# Patient Record
Sex: MALE | Race: WHITE | Employment: FULL TIME | URBAN - METROPOLITAN AREA
[De-identification: names, ages, dates, MRNs, and addresses within clinical notes are randomized per-mention and may not be internally consistent; named-entity substitution may affect disease eponyms.]

---

## 2020-07-30 DIAGNOSIS — F90.9 ATTENTION DEFICIT HYPERACTIVITY DISORDER (ADHD), UNSPECIFIED ADHD TYPE: Primary | ICD-10-CM

## 2020-07-31 NOTE — TELEPHONE ENCOUNTER
Overdue for 4 month f/u. Please schedule for VV next Friday if possible. Then medication refill will be approved.

## 2020-08-04 VITALS
WEIGHT: 210 LBS | DIASTOLIC BLOOD PRESSURE: 82 MMHG | HEART RATE: 88 BPM | HEIGHT: 69 IN | SYSTOLIC BLOOD PRESSURE: 170 MMHG | TEMPERATURE: 98.2 F | OXYGEN SATURATION: 97 % | BODY MASS INDEX: 31.1 KG/M2

## 2020-08-04 PROBLEM — F41.8 PERFORMANCE ANXIETY: Status: ACTIVE | Noted: 2017-11-13

## 2020-08-04 PROBLEM — F90.0 ATTENTION DEFICIT HYPERACTIVITY DISORDER, PREDOMINANTLY INATTENTIVE TYPE: Status: ACTIVE | Noted: 2017-01-24

## 2020-08-04 PROBLEM — F41.1 GENERALIZED ANXIETY DISORDER: Status: ACTIVE | Noted: 2019-12-20

## 2020-08-04 PROBLEM — F40.248 FEAR OF PUBLIC SPEAKING: Status: ACTIVE | Noted: 2019-03-27

## 2020-08-04 RX ORDER — CITALOPRAM 20 MG/1
TABLET, FILM COATED ORAL
COMMUNITY
End: 2020-08-07

## 2020-08-04 RX ORDER — VENLAFAXINE HYDROCHLORIDE 75 MG/1
75 TABLET, EXTENDED RELEASE ORAL DAILY
COMMUNITY
End: 2020-08-07

## 2020-08-04 RX ORDER — PROPRANOLOL HYDROCHLORIDE 40 MG/1
40 TABLET ORAL DAILY
COMMUNITY
End: 2020-08-07

## 2020-08-07 ENCOUNTER — VIRTUAL VISIT (OUTPATIENT)
Dept: FAMILY MEDICINE CLINIC | Age: 40
End: 2020-08-07
Payer: COMMERCIAL

## 2020-08-07 DIAGNOSIS — F41.1 GENERALIZED ANXIETY DISORDER: ICD-10-CM

## 2020-08-07 DIAGNOSIS — F90.0 ATTENTION DEFICIT HYPERACTIVITY DISORDER, PREDOMINANTLY INATTENTIVE TYPE: Primary | ICD-10-CM

## 2020-08-07 DIAGNOSIS — F40.248 FEAR OF PUBLIC SPEAKING: ICD-10-CM

## 2020-08-07 PROCEDURE — 99214 OFFICE O/P EST MOD 30 MIN: CPT | Performed by: NURSE PRACTITIONER

## 2020-08-07 RX ORDER — PROPRANOLOL HYDROCHLORIDE 40 MG/1
40 TABLET ORAL DAILY
Qty: 30 TAB | Refills: 0 | Status: SHIPPED | OUTPATIENT
Start: 2020-08-07 | End: 2020-11-12 | Stop reason: SDUPTHER

## 2020-08-07 RX ORDER — CITALOPRAM 20 MG/1
20 TABLET, FILM COATED ORAL DAILY
Qty: 60 TAB | Refills: 0 | Status: SHIPPED | OUTPATIENT
Start: 2020-08-07 | End: 2020-08-07

## 2020-08-07 RX ORDER — ESCITALOPRAM OXALATE 10 MG/1
10 TABLET ORAL DAILY
Qty: 60 TAB | Refills: 0 | Status: SHIPPED | OUTPATIENT
Start: 2020-08-07 | End: 2021-02-12

## 2020-08-07 NOTE — PROGRESS NOTES
Consent: Belen Valerio, who was seen by synchronous (real-time) audio-video technology, and/or his healthcare decision maker, is aware that this patient-initiated, Telehealth encounter on 8/7/2020 is a billable service, with coverage as determined by his insurance carrier. He is aware that he may receive a bill and has provided verbal consent to proceed: YES-Consent obtained within past 12 months        712  Subjective:   Belen Valerio is a 42 y.o. male who was seen for Behavioral Problem  Patient presents for management of ADHD, anxiety, and fear of public speaking. He was last seen 2/28/2020. Stopped Venlafaxine due to excessive fatigue. Does not feel anxiety is well controlled off meds. Propranolol offered some improvement of fear of public speaking. Doing well on current dose of Vyvanse. Work Performance: no issue  Organization: good  Appetite: normal, no binge eating, weight up since COVID  Mood: an issue  Sleep: good, not tired at work  Friends: well connected with peers  Family: no new stressors  Self esteem: high    Prior to Admission medications    Medication Sig Start Date End Date Taking? Authorizing Provider   propranoloL (INDERAL) 40 mg tablet Take 1 Tab by mouth daily. 8/7/20  Yes Kel Dseouza NP   escitalopram oxalate (LEXAPRO) 10 mg tablet Take 1 Tab by mouth daily. 8/7/20  Yes Kel Desouza NP   citalopram (CELEXA) 20 mg tablet Take 1 Tab by mouth daily. 8/7/20 8/7/20  Kel Desouza NP   citalopram (CELEXA) 20 mg tablet citalopram 20 mg tablet   TAKE 1 TABLET BY MOUTH EVERY DAY  8/7/20  Provider, Historical   propranoloL (INDERAL) 40 mg tablet Take 40 mg by mouth daily. 8/7/20  Provider, Historical   Venlafaxine-ER 24 HR (EFFEXOR-ER) 75 mg tr24 tablet Take 75 mg by mouth daily.   8/7/20  Provider, Historical   lisdexamfetamine (Vyvanse) 30 mg capsule Vyvanse 30 mg capsule  TAKE 1 CAPSULE BY MOUTH TWICE A DAY 7/31/20   Kel Desouza NP     Allergies   Allergen Reactions  Sulfa (Sulfonamide Antibiotics) Nausea Only and Nausea and Vomiting     Patient Active Problem List    Diagnosis    Generalized anxiety disorder    Fear of public speaking    Performance anxiety    Attention deficit hyperactivity disorder, predominantly inattentive type         ROS  See HPI for pertinent ROS. Objective:   Vital Signs: (As obtained by patient/caregiver at home)  There were no vitals taken for this visit. [INSTRUCTIONS:  \"[x]\" Indicates a positive item  \"[]\" Indicates a negative item  -- DELETE ALL ITEMS NOT EXAMINED]    Constitutional: [x] Appears well-developed and well-nourished [x] No apparent distress      [] Abnormal -     Mental status: [x] Alert and awake  [x] Oriented to person/place/time [x] Able to follow commands    [] Abnormal -     Eyes:   EOM    [x]  Normal    [] Abnormal -   Sclera  [x]  Normal    [] Abnormal -          Discharge [x]  None visible   [] Abnormal -     HENT: [x] Normocephalic, atraumatic  [] Abnormal -   [x] Mouth/Throat: Mucous membranes are moist    External Ears [x] Normal  [] Abnormal -    Neck: [x] No visualized mass [] Abnormal -     Pulmonary/Chest: [x] Respiratory effort normal   [x] No visualized signs of difficulty breathing or respiratory distress        [] Abnormal -        Neurological:        [x] No Facial Asymmetry (Cranial nerve 7 motor function) (limited exam due to video visit)          [x] No gaze palsy        [] Abnormal -          Skin:        [x] No significant exanthematous lesions or discoloration noted on facial skin         [] Abnormal -            Psychiatric:       [x] Normal Affect [] Abnormal -        [x] No Hallucinations    Other pertinent observable physical exam findings:-              Assessment & Plan:   Diagnoses and all orders for this visit:    1. Attention deficit hyperactivity disorder, predominantly inattentive type   checked, no misuse or abuse noted.   ADHD remains well controlled on current medication and dose.    2. Generalized anxiety disorder  He has now failed Lexapro, Celexa, and venlafaxine. Discussed BuSpar versus Lexapro for treatment of anxiety and he preferrs to try Lexapro at this time. Reminded side effects are worse during the first week and then improve, take medication daily and not stop abruptly. It can take 6-8 weeks to reach therapeutic dosing.    -     escitalopram oxalate (LEXAPRO) 10 mg tablet; Take 1 Tab by mouth daily. 3. Fear of public speaking  Well controlled with Propranolol before speaking in front of large groups. -     propranoloL (INDERAL) 40 mg tablet; Take 1 Tab by mouth daily. Follow-up and Dispositions    · Return in about 4 months (around 12/7/2020) for follow up, adhd, anxiety (VVok). We discussed the expected course, resolution and complications of the diagnosis(es) in detail. Medication risks, benefits, costs, interactions, and alternatives were discussed as indicated. I advised him to contact the office if his condition worsens, changes or fails to improve as anticipated. He expressed understanding with the diagnosis(es) and plan. Dorota Fernandes is a 42 y.o. male being evaluated by a video visit encounter for concerns as above. A caregiver was present when appropriate. Due to this being a TeleHealth encounter (During KLZLU-19 public health emergency), evaluation of the following organ systems was limited: Vitals/Constitutional/EENT/Resp/CV/GI//MS/Neuro/Skin/Heme-Lymph-Imm. Pursuant to the emergency declaration under the [facility][note 3][facility], 1135 waiver authority and the [employer] Act, this Virtual  Visit was conducted, with patient's (and/or legal guardian's) consent, to reduce the patient's risk of exposure to COVID-19 and provide necessary medical care.      Services were provided through a video synchronous discussion virtually to substitute for in-person clinic visit. Patient and provider were located at their individual homes.         Abbe Perez NP

## 2020-08-31 DIAGNOSIS — F90.9 ATTENTION DEFICIT HYPERACTIVITY DISORDER (ADHD), UNSPECIFIED ADHD TYPE: ICD-10-CM

## 2020-10-09 ENCOUNTER — TELEPHONE (OUTPATIENT)
Dept: FAMILY MEDICINE CLINIC | Age: 40
End: 2020-10-09

## 2020-10-09 DIAGNOSIS — F90.9 ATTENTION DEFICIT HYPERACTIVITY DISORDER (ADHD), UNSPECIFIED ADHD TYPE: ICD-10-CM

## 2020-10-12 ENCOUNTER — TELEPHONE (OUTPATIENT)
Dept: FAMILY MEDICINE CLINIC | Age: 40
End: 2020-10-12

## 2020-11-12 ENCOUNTER — TELEPHONE (OUTPATIENT)
Dept: FAMILY MEDICINE CLINIC | Age: 40
End: 2020-11-12

## 2020-11-12 DIAGNOSIS — F90.9 ATTENTION DEFICIT HYPERACTIVITY DISORDER (ADHD), UNSPECIFIED ADHD TYPE: ICD-10-CM

## 2020-11-12 DIAGNOSIS — F40.248 FEAR OF PUBLIC SPEAKING: ICD-10-CM

## 2020-11-12 RX ORDER — PROPRANOLOL HYDROCHLORIDE 40 MG/1
40 TABLET ORAL DAILY
Qty: 30 TAB | Refills: 0 | Status: SHIPPED | OUTPATIENT
Start: 2020-11-12 | End: 2020-12-06

## 2020-12-05 DIAGNOSIS — F40.248 FEAR OF PUBLIC SPEAKING: ICD-10-CM

## 2020-12-06 RX ORDER — PROPRANOLOL HYDROCHLORIDE 40 MG/1
TABLET ORAL
Qty: 30 TAB | Refills: 0 | Status: SHIPPED | OUTPATIENT
Start: 2020-12-06

## 2020-12-17 DIAGNOSIS — F90.9 ATTENTION DEFICIT HYPERACTIVITY DISORDER (ADHD), UNSPECIFIED ADHD TYPE: ICD-10-CM

## 2021-02-03 DIAGNOSIS — F90.9 ATTENTION DEFICIT HYPERACTIVITY DISORDER (ADHD), UNSPECIFIED ADHD TYPE: ICD-10-CM

## 2021-02-09 ENCOUNTER — TELEPHONE (OUTPATIENT)
Dept: FAMILY MEDICINE CLINIC | Age: 41
End: 2021-02-09

## 2021-02-09 DIAGNOSIS — F90.9 ATTENTION DEFICIT HYPERACTIVITY DISORDER (ADHD), UNSPECIFIED ADHD TYPE: ICD-10-CM

## 2021-02-09 NOTE — TELEPHONE ENCOUNTER
Spoke to patient, patient has vv visit for Friday but was told needs a in office visit. Patient will schedule the in office visit but wants to know can you just refill his rx. I told him you have sent 10 to pharmacy. He states he has to pay 40 dollars to get the 10 then pay another 40 to get the others when he comes in for his appt and doesn't want to have to pay that kind of money. Please advise

## 2021-02-09 NOTE — TELEPHONE ENCOUNTER
Please call wife and find out what she needs. This is the first I am seeing this. Since he is two months overdue for f/u, I am sending 10 tablets to his pharmacy. This will be the last one until he is seen.

## 2021-02-10 NOTE — TELEPHONE ENCOUNTER
He can be seen virtually on Friday but the next one will have to be in person. I am not sending any more tablets until he is seen. He is 2 months overdue and was told last month there would be no refills until he has an appointment.

## 2021-02-12 ENCOUNTER — VIRTUAL VISIT (OUTPATIENT)
Dept: FAMILY MEDICINE CLINIC | Age: 41
End: 2021-02-12
Payer: COMMERCIAL

## 2021-02-12 DIAGNOSIS — F90.9 ATTENTION DEFICIT HYPERACTIVITY DISORDER (ADHD), UNSPECIFIED ADHD TYPE: ICD-10-CM

## 2021-02-12 DIAGNOSIS — F41.1 GENERALIZED ANXIETY DISORDER: Primary | ICD-10-CM

## 2021-02-12 DIAGNOSIS — F40.248 FEAR OF PUBLIC SPEAKING: ICD-10-CM

## 2021-02-12 PROCEDURE — 99214 OFFICE O/P EST MOD 30 MIN: CPT | Performed by: NURSE PRACTITIONER

## 2021-02-12 RX ORDER — CITALOPRAM 20 MG/1
20 TABLET, FILM COATED ORAL DAILY
COMMUNITY
End: 2021-02-12 | Stop reason: SDUPTHER

## 2021-02-12 RX ORDER — CITALOPRAM 20 MG/1
20 TABLET, FILM COATED ORAL DAILY
Qty: 90 TAB | Refills: 3 | Status: SHIPPED | OUTPATIENT
Start: 2021-02-12 | End: 2021-12-21 | Stop reason: SDUPTHER

## 2021-02-12 NOTE — PROGRESS NOTES
Consent: Loring Gowers, who was seen by synchronous (real-time) audio-video technology, and/or his healthcare decision maker, is aware that this patient-initiated, Telehealth encounter on 2/12/2021 is a billable service, with coverage as determined by his insurance carrier. He is aware that he may receive a bill and has provided verbal consent to proceed: YES-Consent obtained within past 12 months        712  Subjective:   Loring Gowers is a 47 y.o. male who was seen for Follow Up Chronic Condition  Patient presents for management of ADHD, anxiety, and fear of public speaking. Medications reviewed, taking as prescribed with no known side effects. Anxiety remains well controlled on Celexa daily. Declines trial off medication. Fear of public speaking is well controlled with propanolol prior to speaking. Has needed 2 tablets since last refill in December. ADHD remains well controlled on Vyvanse 30 mg twice daily. Work Performance: no issues  Organization: good  Appetite: normal, no binge eating, weight stable  Mood: stable  Sleep: good, not tired at work  Friends: well connected with peers  Family: no new stressors  Self esteem: high      Prior to Admission medications    Medication Sig Start Date End Date Taking? Authorizing Provider   lisdexamfetamine (Vyvanse) 30 mg capsule Vyvanse 30 mg capsule  TAKE 1 CAPSULE BY MOUTH TWICE A DAY 2/12/21  Yes Saadia Baeza NP   citalopram (CELEXA) 20 mg tablet Take 1 Tab by mouth daily. 2/12/21  Yes Saadia Baeza NP   propranoloL (INDERAL) 40 mg tablet TAKE 1 TABLET BY MOUTH EVERY DAY 12/6/20  Yes Saadia Baeza NP   citalopram (CELEXA) 20 mg tablet Take 20 mg by mouth daily.   2/12/21  Provider, Historical   lisdexamfetamine (Vyvanse) 30 mg capsule Vyvanse 30 mg capsule  TAKE 1 CAPSULE BY MOUTH TWICE A DAY (OVERDUE FOR OFFICE VISIT, LAST REFILL UNTIL SEEN) 2/9/21 2/12/21  Saadia Baeza NP   escitalopram oxalate (LEXAPRO) 10 mg tablet Take 1 Tab by mouth daily. 8/7/20 2/12/21  Miguel Hare NP     Allergies   Allergen Reactions    Sulfa (Sulfonamide Antibiotics) Nausea Only and Nausea and Vomiting     Patient Active Problem List    Diagnosis    Generalized anxiety disorder    Fear of public speaking    Performance anxiety    Attention deficit hyperactivity disorder, predominantly inattentive type         ROS  See HPI for pertinent ROS. Objective:   Vital Signs: (As obtained by patient/caregiver at home)  There were no vitals taken for this visit. [INSTRUCTIONS:  \"[x]\" Indicates a positive item  \"[]\" Indicates a negative item  -- DELETE ALL ITEMS NOT EXAMINED]    Constitutional: [x] Appears well-developed and well-nourished [x] No apparent distress      [] Abnormal -     Mental status: [x] Alert and awake  [x] Oriented to person/place/time [x] Able to follow commands    [] Abnormal -     Eyes:   EOM    [x]  Normal    [] Abnormal -   Sclera  [x]  Normal    [] Abnormal -          Discharge [x]  None visible   [] Abnormal -     HENT: [x] Normocephalic, atraumatic  [] Abnormal -   [x] Mouth/Throat: Mucous membranes are moist    External Ears [x] Normal  [] Abnormal -    Neck: [x] No visualized mass [] Abnormal -     Pulmonary/Chest: [x] Respiratory effort normal   [x] No visualized signs of difficulty breathing or respiratory distress        [] Abnormal -        Neurological:        [x] No Facial Asymmetry (Cranial nerve 7 motor function) (limited exam due to video visit)          [x] No gaze palsy        [] Abnormal -          Skin:        [x] No significant exanthematous lesions or discoloration noted on facial skin         [] Abnormal -            Psychiatric:       [x] Normal Affect [] Abnormal -        [x] No Hallucinations    Other pertinent observable physical exam findings:-              Assessment & Plan:   Diagnoses and all orders for this visit:    1. Generalized anxiety disorder  Well-controlled on Celexa daily.   Reminded to take medication daily and do not abruptly discontinue. -     citalopram (CELEXA) 20 mg tablet; Take 1 Tab by mouth daily. 2. Fear of public speaking  Well controlled with propanolol as needed. Medication will be refilled when requested. 3. Attention deficit hyperactivity disorder (ADHD), unspecified ADHD type   checked, no misuse or abuse noted. ADHD remains well controlled on current medication and dose. Medication refilled today. Pharmacy notified to cancel #10 tablets ordered on 2/9/2021. Reminded office visits are required every 4 months, next visit due in June. -     lisdexamfetamine (Vyvanse) 30 mg capsule; Vyvanse 30 mg capsule  TAKE 1 CAPSULE BY MOUTH TWICE A DAY          Follow-up and Dispositions    · Return in about 4 months (around 6/12/2021) for wellness, fasting labs, follow up, adhd. We discussed the expected course, resolution and complications of the diagnosis(es) in detail. Medication risks, benefits, costs, interactions, and alternatives were discussed as indicated. I advised him to contact the office if his condition worsens, changes or fails to improve as anticipated. He expressed understanding with the diagnosis(es) and plan. Naomi Roberto is a 47 y.o. male being evaluated by a video visit encounter for concerns as above. A caregiver was present when appropriate. Due to this being a TeleHealth encounter (During GFSXI-14 public health emergency), evaluation of the following organ systems was limited: Vitals/Constitutional/EENT/Resp/CV/GI//MS/Neuro/Skin/Heme-Lymph-Imm. Pursuant to the emergency declaration under the [facility], 1135 waiver authority and the [employer] Act, this Virtual  Visit was conducted, with patient's (and/or legal guardian's) consent, to reduce the patient's risk of exposure to COVID-19 and provide necessary medical care.      Services were provided through a video synchronous discussion virtually to substitute for in-person clinic visit. Patient and provider were located at their individual homes.         Maddy Trujillo NP

## 2021-03-17 DIAGNOSIS — F90.9 ATTENTION DEFICIT HYPERACTIVITY DISORDER (ADHD), UNSPECIFIED ADHD TYPE: ICD-10-CM

## 2021-04-19 DIAGNOSIS — F90.9 ATTENTION DEFICIT HYPERACTIVITY DISORDER (ADHD), UNSPECIFIED ADHD TYPE: ICD-10-CM

## 2021-05-17 ENCOUNTER — TELEPHONE (OUTPATIENT)
Dept: FAMILY MEDICINE CLINIC | Age: 41
End: 2021-05-17

## 2021-05-17 DIAGNOSIS — F90.9 ATTENTION DEFICIT HYPERACTIVITY DISORDER (ADHD), UNSPECIFIED ADHD TYPE: ICD-10-CM

## 2021-05-17 NOTE — TELEPHONE ENCOUNTER
Patient needs a refill for his Vyvanse and he is leaving for [place] today and will be there they rest of the week. He did not know this.

## 2021-06-15 ENCOUNTER — TELEPHONE (OUTPATIENT)
Dept: FAMILY MEDICINE CLINIC | Age: 41
End: 2021-06-15

## 2021-06-15 DIAGNOSIS — F90.9 ATTENTION DEFICIT HYPERACTIVITY DISORDER (ADHD), UNSPECIFIED ADHD TYPE: ICD-10-CM

## 2021-06-15 NOTE — TELEPHONE ENCOUNTER
I have approved medication for  on 6/18. This will be his last refill until he is seen in the office.

## 2021-06-15 NOTE — TELEPHONE ENCOUNTER
PT stated he will be out of town working for the next 2 weeks and would like a refill on Vyvanse 30 mg capsule. I explained to pt that he is required to have an in office visit in order to get any refills. Pt had an appt 05/27 and was a no show.

## 2021-07-23 ENCOUNTER — OFFICE VISIT (OUTPATIENT)
Dept: FAMILY MEDICINE CLINIC | Age: 41
End: 2021-07-23
Payer: COMMERCIAL

## 2021-07-23 VITALS
DIASTOLIC BLOOD PRESSURE: 80 MMHG | BODY MASS INDEX: 30.24 KG/M2 | OXYGEN SATURATION: 96 % | SYSTOLIC BLOOD PRESSURE: 124 MMHG | WEIGHT: 204.2 LBS | HEART RATE: 71 BPM | HEIGHT: 69 IN | TEMPERATURE: 97 F | RESPIRATION RATE: 18 BRPM

## 2021-07-23 DIAGNOSIS — E66.09 CLASS 1 OBESITY DUE TO EXCESS CALORIES WITH BODY MASS INDEX (BMI) OF 30.0 TO 30.9 IN ADULT, UNSPECIFIED WHETHER SERIOUS COMORBIDITY PRESENT: ICD-10-CM

## 2021-07-23 DIAGNOSIS — F90.9 ATTENTION DEFICIT HYPERACTIVITY DISORDER (ADHD), UNSPECIFIED ADHD TYPE: Primary | ICD-10-CM

## 2021-07-23 DIAGNOSIS — F41.1 GENERALIZED ANXIETY DISORDER: ICD-10-CM

## 2021-07-23 DIAGNOSIS — F40.248 FEAR OF PUBLIC SPEAKING: ICD-10-CM

## 2021-07-23 PROCEDURE — 99214 OFFICE O/P EST MOD 30 MIN: CPT | Performed by: NURSE PRACTITIONER

## 2021-07-23 NOTE — PROGRESS NOTES
Chief Complaint   Patient presents with    Medication Refill     vynase    Follow-up     1. Have you been to the ER, urgent care clinic since your last visit? Hospitalized since your last visit? No    2. Have you seen or consulted any other health care providers outside of the [address] [place] [address] since your last visit? Include any pap smears or colon screening.  No  3 most recent PHQ Screens 7/23/2021   Little interest or pleasure in doing things Not at all   Feeling down, depressed, irritable, or hopeless Not at all   Total Score PHQ 2 0     Visit Vitals  /80 (BP 1 Location: Left upper arm, BP Patient Position: Sitting, BP Cuff Size: Adult)   Pulse 71   Temp 97 °F (36.1 °C) (Temporal)   Resp 18   Ht 5' 9\" (1.753 m)   Wt 204 lb 3.2 oz (92.6 kg)   SpO2 96%   BMI 30.16 kg/m²

## 2021-07-23 NOTE — PROGRESS NOTES
Subjective  Chief Complaint   Patient presents with    Medication Refill     vynase    Follow-up     HPI:  Sharyle Parks is a 47 y.o. male. Patient presents for management of ADHD. Last seen in the office 2020. Feels ADHD remains well controlled on Vyvanse 30mg BID. Work Performance: no issues  Organization: good  Appetite: normal, no binge eating, weight stable  Mood: stable  Sleep: good, not tired at work  Friends: well connected with peers  Family: no new stressors  Self esteem: high    Past Medical History:   Diagnosis Date    ADHD (attention deficit hyperactivity disorder)     Fear of public speaking     Generalized anxiety disorder     Performance anxiety      Family History   Problem Relation Age of Onset    Other Father         Multiple Myeloma    Heart Disease Maternal Grandmother     Kidney Disease Maternal Grandmother     Heart Disease Maternal Grandfather     No Known Problems Mother      Social History     Socioeconomic History    Marital status: SINGLE     Spouse name: Not on file    Number of children: Not on file    Years of education: Not on file    Highest education level: Not on file   Occupational History    Not on file   Tobacco Use    Smoking status: Former Smoker     Packs/day: 1.00     Years: 20.00     Pack years: 20.00     Quit date:      Years since quittin.5    Smokeless tobacco: Never Used   Vaping Use    Vaping Use: Never used   Substance and Sexual Activity    Alcohol use: Yes     Comment: Moderate    Drug use: Never    Sexual activity: Yes   Other Topics Concern    Not on file   Social History Narrative    Not on file     Social Determinants of Health     Financial Resource Strain:     Difficulty of Paying Living Expenses:    Food Insecurity:     Worried About Running Out of Food in the Last Year:     [address] in the Last Year:    Transportation Needs:     Lack of Transportation (Medical):      Lack of Transportation (Non-Medical):    Physical Activity:     Days of Exercise per Week:     Minutes of Exercise per Session:    Stress:     Feeling of Stress :    Social Connections:     Frequency of Communication with Friends and Family:     Frequency of Social Gatherings with Friends and Family:     Attends [employer] Services:     Active Member of Clubs or Organizations:     Attends Club or Organization Meetings:     Marital Status:    Intimate Partner Violence:     Fear of Current or Ex-Partner:     Emotionally Abused:     Physically Abused:     Sexually Abused:      Current Outpatient Medications on File Prior to Visit   Medication Sig Dispense Refill    citalopram (CELEXA) 20 mg tablet Take 1 Tab by mouth daily. 90 Tab 3    propranoloL (INDERAL) 40 mg tablet TAKE 1 TABLET BY MOUTH EVERY DAY 30 Tab 0    [DISCONTINUED] lisdexamfetamine (Vyvanse) 30 mg capsule Vyvanse 30 mg capsule  TAKE 1 CAPSULE BY MOUTH TWICE A DAY 60 Capsule 0     No current facility-administered medications on file prior to visit. Allergies   Allergen Reactions    Sulfa (Sulfonamide Antibiotics) Nausea Only and Nausea and Vomiting     ROS  See HPI for pertinent ROS. Objective  Visit Vitals  /80 (BP 1 Location: Left upper arm, BP Patient Position: Sitting, BP Cuff Size: Adult)   Pulse 71   Temp 97 °F (36.1 °C) (Temporal)   Resp 18   Ht 5' 9\" (1.753 m)   Wt 204 lb 3.2 oz (92.6 kg)   SpO2 96%   BMI 30.16 kg/m²       Physical Exam  Vitals and nursing note reviewed. Constitutional:       General: He is not in acute distress. Appearance: Normal appearance. He is obese. HENT:      Head: Normocephalic. Eyes:      Extraocular Movements: Extraocular movements intact. Cardiovascular:      Rate and Rhythm: Normal rate and regular rhythm. Heart sounds: Normal heart sounds. Pulmonary:      Effort: Pulmonary effort is normal.      Breath sounds: Normal breath sounds. Musculoskeletal:         General: Normal range of motion. Right lower leg: No edema. Left lower leg: No edema. Skin:     General: Skin is warm and dry. Neurological:      Mental Status: He is alert and oriented to person, place, and time. Psychiatric:         Mood and Affect: Mood normal.         Behavior: Behavior normal.          Assessment & Plan      ICD-10-CM ICD-9-CM    1. Attention deficit hyperactivity disorder (ADHD), unspecified ADHD type  F90.9 314.01 lisdexamfetamine (Vyvanse) 30 mg capsule   2. Generalized anxiety disorder  F41.1 300.02    3. Fear of public speaking  R52.112 300.23    4. Class 1 obesity due to excess calories with body mass index (BMI) of 30.0 to 30.9 in adult, unspecified whether serious comorbidity present  E66.09 278.00     Z68.30 V85.30      Diagnoses and all orders for this visit:    1. Attention deficit hyperactivity disorder (ADHD), unspecified ADHD type  PDMP checked, no misuse or abuse noted. ADHD remains well controlled on current medication and dose. Medication refilled as requested. -     lisdexamfetamine (Vyvanse) 30 mg capsule; Vyvanse 30 mg capsule  TAKE 1 CAPSULE BY MOUTH TWICE A DAY    2. Generalized anxiety disorder  Anxiety remains well controlled on Celexa daily. Continue to take medication daily and do not abruptly discontinue. 3. Fear of public speaking  Remains well controlled with propanolol as needed. Typically needs 1 to 2 tablets/month. 4. Class 1 obesity due to excess calories with body mass index (BMI) of 30.0 to 30.9 in adult, unspecified whether serious comorbidity present  Weight is down 6 pounds since his last office visit. Follow-up and Dispositions    · Return in about 4 months (around 11/23/2021) for wellness, fasting labs, follow up, adhd, anxiety.            Marc Dumont NP

## 2021-08-24 DIAGNOSIS — F90.9 ATTENTION DEFICIT HYPERACTIVITY DISORDER (ADHD), UNSPECIFIED ADHD TYPE: ICD-10-CM

## 2021-09-27 DIAGNOSIS — F90.9 ATTENTION DEFICIT HYPERACTIVITY DISORDER (ADHD), UNSPECIFIED ADHD TYPE: ICD-10-CM

## 2021-11-02 DIAGNOSIS — F90.9 ATTENTION DEFICIT HYPERACTIVITY DISORDER (ADHD), UNSPECIFIED ADHD TYPE: ICD-10-CM

## 2021-12-17 DIAGNOSIS — F41.1 GENERALIZED ANXIETY DISORDER: ICD-10-CM

## 2021-12-17 DIAGNOSIS — F90.9 ATTENTION DEFICIT HYPERACTIVITY DISORDER (ADHD), UNSPECIFIED ADHD TYPE: ICD-10-CM

## 2021-12-17 RX ORDER — CITALOPRAM 20 MG/1
20 TABLET, FILM COATED ORAL DAILY
Qty: 90 TABLET | Refills: 3 | Status: CANCELLED | OUTPATIENT
Start: 2021-12-17

## 2021-12-20 ENCOUNTER — TELEPHONE (OUTPATIENT)
Dept: FAMILY MEDICINE CLINIC | Age: 41
End: 2021-12-20

## 2021-12-20 DIAGNOSIS — F90.9 ATTENTION DEFICIT HYPERACTIVITY DISORDER (ADHD), UNSPECIFIED ADHD TYPE: ICD-10-CM

## 2021-12-20 DIAGNOSIS — F41.1 GENERALIZED ANXIETY DISORDER: ICD-10-CM

## 2021-12-21 RX ORDER — CITALOPRAM 20 MG/1
20 TABLET, FILM COATED ORAL DAILY
Qty: 30 TABLET | Refills: 0 | Status: SHIPPED | OUTPATIENT
Start: 2021-12-21 | End: 2022-01-26 | Stop reason: SDUPTHER

## 2021-12-21 NOTE — TELEPHONE ENCOUNTER
Overdue for 4-month follow-up and no showed 11/24/2021 appointment. Please reschedule and I will send a one-time 30-day supply to his pharmacy.

## 2021-12-21 NOTE — TELEPHONE ENCOUNTER
Overdue for 4 mo follow up of ADHD. He no showed his last appointment. Please have him schedule office visit and I will refill meds for 30 days.

## 2021-12-22 ENCOUNTER — TELEPHONE (OUTPATIENT)
Dept: FAMILY MEDICINE CLINIC | Age: 41
End: 2021-12-22

## 2021-12-22 DIAGNOSIS — F90.9 ATTENTION DEFICIT HYPERACTIVITY DISORDER (ADHD), UNSPECIFIED ADHD TYPE: ICD-10-CM

## 2021-12-22 NOTE — TELEPHONE ENCOUNTER
SISSY, I verified with pharmacy and they are out. I went ahead and canceled that order. Can you please send to the [facility] requested. I have changed the pharmacy.

## 2021-12-22 NOTE — TELEPHONE ENCOUNTER
Patient stated that he needs the Vyvanse sent to the [facility] on [address] because the previous pharmacy that it was sent to was out of stock and he is now completely out.

## 2022-01-25 DIAGNOSIS — F90.9 ATTENTION DEFICIT HYPERACTIVITY DISORDER (ADHD), UNSPECIFIED ADHD TYPE: ICD-10-CM

## 2022-01-25 NOTE — TELEPHONE ENCOUNTER
No showed his last 2 appointments and now 2 months overdue for office visit. Office visit required for more refills.

## 2022-01-26 ENCOUNTER — OFFICE VISIT (OUTPATIENT)
Dept: FAMILY MEDICINE CLINIC | Age: 42
End: 2022-01-26
Payer: COMMERCIAL

## 2022-01-26 VITALS
TEMPERATURE: 97.3 F | HEIGHT: 70 IN | HEART RATE: 76 BPM | DIASTOLIC BLOOD PRESSURE: 75 MMHG | BODY MASS INDEX: 29.2 KG/M2 | SYSTOLIC BLOOD PRESSURE: 132 MMHG | WEIGHT: 204 LBS | OXYGEN SATURATION: 97 % | RESPIRATION RATE: 16 BRPM

## 2022-01-26 DIAGNOSIS — F90.0 ATTENTION DEFICIT HYPERACTIVITY DISORDER (ADHD), PREDOMINANTLY INATTENTIVE TYPE: ICD-10-CM

## 2022-01-26 DIAGNOSIS — F41.1 GENERALIZED ANXIETY DISORDER: Primary | ICD-10-CM

## 2022-01-26 DIAGNOSIS — F40.248 FEAR OF PUBLIC SPEAKING: ICD-10-CM

## 2022-01-26 PROCEDURE — 99214 OFFICE O/P EST MOD 30 MIN: CPT | Performed by: NURSE PRACTITIONER

## 2022-01-26 RX ORDER — CITALOPRAM 20 MG/1
20 TABLET, FILM COATED ORAL DAILY
Qty: 90 TABLET | Refills: 2 | Status: SHIPPED | OUTPATIENT
Start: 2022-01-26 | End: 2022-06-21 | Stop reason: SDUPTHER

## 2022-01-26 NOTE — PROGRESS NOTES
Chief Complaint   Patient presents with    Follow-up     refill for Vyvanse     Visit Vitals  /75 (BP 1 Location: Left upper arm, BP Patient Position: Sitting)   Pulse 76   Temp 97.3 °F (36.3 °C) (Axillary)   Resp 16   Ht 5' 10\" (1.778 m)   Wt 204 lb (92.5 kg)   SpO2 97%   BMI 29.27 kg/m²     1. Have you been to the ER, urgent care clinic since your last visit? Hospitalized since your last visit? No    2. Have you seen or consulted any other health care providers outside of the [address] [place] [address] since your last visit? Include any pap smears or colon screening.  No

## 2022-01-26 NOTE — PROGRESS NOTES
Laurence Lee (: 1980) is a 46 y.o. male  is here for evaluation of the following chief complaint(s): Follow-up (refill for Vyvanse)     . Historian:  excellent    Assessment/Plan:     Diagnoses and all orders for this visit:    1. Generalized anxiety disorder  -     citalopram (CELEXA) 20 mg tablet; Take 1 Tablet by mouth daily. Indications: repeated episodes of anxiety    2. Attention deficit hyperactivity disorder (ADHD), predominantly inattentive type  -     lisdexamfetamine (Vyvanse) 30 mg capsule; Vyvanse 30 mg capsule  TAKE 1 CAPSULE BY MOUTH TWICE A DAY    3. Fear of public speaking    Since last visit: no change. Medication compliance: all of the time. Reports symptoms are well controlled. Side effects from medication include: none.  has been reviewed. Return in about 4 months (around 2022) for wellness, adhd. Subjective/Objective:   His concerns today are chronic conditions. Symptoms have been present for several years. He reports these issues have had the following impacting on their life: trouble at work. Family history of ADHD: Yes, No.    Marital Status: co-habitating  Arrived With: alone  Handedness: right handed  Work Status: full time job doing superintendent  Assistive Devices:  sunglasses, no corrective lenses  Symptoms of inattention: has difficulty organizing tasks and activities, loses things that are necessary for tasks and activities, is easily distracted by extraneous stimuli, is often forgetful in daily activities. Symptoms of hyperactivity include: none. Symptoms of impulsivity: none. Appetite: good  Sleep: excellent  Counseling for same: No  Workup for problem, if any: none    Previous ADHD testing has been completed: yes. Current medications include: Vyvanse 30. He has tried the following medications in the past: no side effects   has been reviewed. reports that he quit smoking about 12 months ago. He has a 20.00 pack-year smoking history.  He has never used smokeless tobacco. He reports current alcohol use. He reports that he does not use drugs.         Current Outpatient Medications   Medication Instructions    citalopram (CELEXA) 20 mg, Oral, DAILY    lisdexamfetamine (Vyvanse) 30 mg capsule Vyvanse 30 mg capsule  TAKE 1 CAPSULE BY MOUTH TWICE A DAY    propranoloL (INDERAL) 40 mg tablet TAKE 1 TABLET BY MOUTH EVERY DAY     Past Medical History:   Diagnosis Date    ADHD (attention deficit hyperactivity disorder)     Fear of public speaking     Generalized anxiety disorder     Performance anxiety        ROS:  Denies  - Involuntary weight loss  - New or worsening abdominal discomfort, nausea, vomiting or changes in bowel pattern  - New or worsening depression or thoughts of self harm  - Feelings of irritability, agitation, aggression or increased anxiety  - New or worsening difficulty sleeping  - Hallucinations or confusion  - Chest pain or shortness of breath  - Irregular or pounding heart beat  - Light-headedness or dizziness, expecially upon standing from a sitting or lying position  - Episodes of fainting or near fainting  - New or worsening headaches  - New or worsening tremors or tics  - New or unexplained changes in vision  - Any signs of allergic reactions to medication, such as a new rash or itchiness    Vitals:    01/26/22 1459   BP: 132/75   Pulse: 76   Resp: 16   Temp: 97.3 °F (36.3 °C)   TempSrc: Axillary   SpO2: 97%   Weight: 204 lb (92.5 kg)   Height: 5' 10\" (1.778 m)       Physical Exam:  Alertness: alert,   Orientation: time, date, place  Behavior: normal  Speech: appropriate quality, quantity and organization of sentences  Thought content: normal  Affect: euthymic  Affect: normal  Thought content exhibits logical connections  Motor: within normal limits

## 2022-01-26 NOTE — PATIENT INSTRUCTIONS

## 2022-02-25 DIAGNOSIS — F90.0 ATTENTION DEFICIT HYPERACTIVITY DISORDER (ADHD), PREDOMINANTLY INATTENTIVE TYPE: ICD-10-CM

## 2022-03-18 PROBLEM — F40.248 FEAR OF PUBLIC SPEAKING: Status: ACTIVE | Noted: 2019-03-27

## 2022-03-18 PROBLEM — F41.1 GENERALIZED ANXIETY DISORDER: Status: ACTIVE | Noted: 2019-12-20

## 2022-03-19 PROBLEM — F90.0 ATTENTION DEFICIT HYPERACTIVITY DISORDER, PREDOMINANTLY INATTENTIVE TYPE: Status: ACTIVE | Noted: 2017-01-24

## 2022-03-20 PROBLEM — F41.8 PERFORMANCE ANXIETY: Status: ACTIVE | Noted: 2017-11-13

## 2022-03-25 ENCOUNTER — TELEPHONE (OUTPATIENT)
Dept: FAMILY MEDICINE CLINIC | Age: 42
End: 2022-03-25

## 2022-03-25 DIAGNOSIS — F90.0 ATTENTION DEFICIT HYPERACTIVITY DISORDER (ADHD), PREDOMINANTLY INATTENTIVE TYPE: ICD-10-CM

## 2022-04-22 DIAGNOSIS — F90.0 ATTENTION DEFICIT HYPERACTIVITY DISORDER (ADHD), PREDOMINANTLY INATTENTIVE TYPE: ICD-10-CM

## 2022-04-25 DIAGNOSIS — F90.0 ATTENTION DEFICIT HYPERACTIVITY DISORDER (ADHD), PREDOMINANTLY INATTENTIVE TYPE: ICD-10-CM

## 2022-04-25 NOTE — TELEPHONE ENCOUNTER
Pt is garyley out of medication and pharmacy does not have it in stock but [facility] does please send new RX to pharmacy .

## 2022-05-23 DIAGNOSIS — F90.0 ATTENTION DEFICIT HYPERACTIVITY DISORDER (ADHD), PREDOMINANTLY INATTENTIVE TYPE: ICD-10-CM

## 2022-06-21 DIAGNOSIS — F90.0 ATTENTION DEFICIT HYPERACTIVITY DISORDER (ADHD), PREDOMINANTLY INATTENTIVE TYPE: ICD-10-CM

## 2022-06-21 DIAGNOSIS — F41.1 GENERALIZED ANXIETY DISORDER: ICD-10-CM

## 2022-06-21 RX ORDER — CITALOPRAM 20 MG/1
20 TABLET, FILM COATED ORAL DAILY
Qty: 90 TABLET | Refills: 2 | Status: SHIPPED | OUTPATIENT
Start: 2022-06-21

## 2022-06-21 NOTE — TELEPHONE ENCOUNTER
----- Message from [facility] sent at 6/21/2022 11:39 AM EDT -----  Subject: Refill Request    QUESTIONS  Name of Medication? citalopram (CELEXA) 20 mg tablet  Patient-reported dosage and instructions? once a day  How many days do you have left? 2  Preferred Pharmacy? [facility]/PHARMACY #[id number]  Pharmacy phone number (if available)? [phone]  ---------------------------------------------------------------------------  --------------,  Name of Medication? lisdexamfetamine (Vyvanse) 30 mg capsule  Patient-reported dosage and instructions? two a day  How many days do you have left? 2  Preferred Pharmacy? [facility]/PHARMACY #[id number]  Pharmacy phone number (if available)? [phone]  ---------------------------------------------------------------------------  --------------  Rl BACK  What is the best way for the office to contact you? OK to leave message on   voicemail  Preferred Call Back Phone Number? [phone]  ---------------------------------------------------------------------------  --------------  SCRIPT ANSWERS  Relationship to Patient?  Self

## 2022-06-22 ENCOUNTER — TELEPHONE (OUTPATIENT)
Dept: FAMILY MEDICINE CLINIC | Age: 42
End: 2022-06-22

## 2022-06-22 DIAGNOSIS — F90.0 ATTENTION DEFICIT HYPERACTIVITY DISORDER (ADHD), PREDOMINANTLY INATTENTIVE TYPE: ICD-10-CM

## 2022-06-22 NOTE — TELEPHONE ENCOUNTER
622/22-pt's wife called back checking on the status of the refill, (with a very loud,sarcastic voice) stating that she has a problem with this office every month on getting refills that this office should be like her son's dr and send over 3 months of refills but put the date when it can be refilled.  Stated that another big problem that she has with this office is when she calls here she never gets told if they need an appt, etc.

## 2022-06-22 NOTE — TELEPHONE ENCOUNTER
----- Message from Ja Mckeon sent at 6/22/2022  8:39 AM EDT -----  Subject: Refill Request    QUESTIONS  Name of Medication? lisdexamfetamine (Vyvanse) 30 mg capsule  Patient-reported dosage and instructions? 30 mg capsule taken twice a day   How many days do you have left? 0  Preferred Pharmacy? [facility]/PHARMACY #[id number]  Pharmacy phone number (if available)? [phone]  Additional Information for Provider? patient is completely out of meds   until upcoming appt. on 7/27/22, would like a refill until then   ---------------------------------------------------------------------------  --------------  [address]  What is the best way for the office to contact you? OK to leave message on   voicemail  Preferred Call Back Phone Number? [phone]  ---------------------------------------------------------------------------  --------------  SCRIPT ANSWERS  Relationship to Patient?  Self

## 2022-07-22 DIAGNOSIS — F90.0 ATTENTION DEFICIT HYPERACTIVITY DISORDER (ADHD), PREDOMINANTLY INATTENTIVE TYPE: ICD-10-CM

## 2022-07-27 ENCOUNTER — OFFICE VISIT (OUTPATIENT)
Dept: FAMILY MEDICINE CLINIC | Age: 42
End: 2022-07-27
Payer: COMMERCIAL

## 2022-07-27 VITALS
HEART RATE: 106 BPM | HEIGHT: 70 IN | BODY MASS INDEX: 28.77 KG/M2 | WEIGHT: 201 LBS | RESPIRATION RATE: 16 BRPM | OXYGEN SATURATION: 98 % | SYSTOLIC BLOOD PRESSURE: 140 MMHG | DIASTOLIC BLOOD PRESSURE: 100 MMHG

## 2022-07-27 DIAGNOSIS — F90.0 ATTENTION DEFICIT HYPERACTIVITY DISORDER, PREDOMINANTLY INATTENTIVE TYPE: ICD-10-CM

## 2022-07-27 DIAGNOSIS — Z11.59 ENCOUNTER FOR HEPATITIS C SCREENING TEST FOR LOW RISK PATIENT: ICD-10-CM

## 2022-07-27 DIAGNOSIS — Z13.220 SCREENING CHOLESTEROL LEVEL: ICD-10-CM

## 2022-07-27 DIAGNOSIS — Z51.81 MEDICATION MONITORING ENCOUNTER: ICD-10-CM

## 2022-07-27 DIAGNOSIS — Z13.0 SCREENING FOR DEFICIENCY ANEMIA: ICD-10-CM

## 2022-07-27 DIAGNOSIS — E66.3 OVERWEIGHT (BMI 25.0-29.9): ICD-10-CM

## 2022-07-27 DIAGNOSIS — Z00.00 ENCOUNTER FOR WELLNESS EXAMINATION IN ADULT: Primary | ICD-10-CM

## 2022-07-27 PROCEDURE — 99395 PREV VISIT EST AGE 18-39: CPT | Performed by: NURSE PRACTITIONER

## 2022-07-27 PROCEDURE — 99214 OFFICE O/P EST MOD 30 MIN: CPT | Performed by: NURSE PRACTITIONER

## 2022-07-27 NOTE — PROGRESS NOTES
Chief Complaint   Patient presents with    Follow-up     ADHD     1. \"Have you been to the ER, urgent care clinic since your last visit? Hospitalized since your last visit? \" No  Visit Vitals  BP (!) 140/100 (BP 1 Location: Left upper arm, BP Patient Position: Sitting, BP Cuff Size: Large adult)   Pulse (!) 106   Resp 16   Ht 5' 10\" (1.778 m)   Wt 201 lb (91.2 kg)   SpO2 98%   BMI 28.84 kg/m²         2. \"Have you seen or consulted any other health care providers outside of the [address] [place] [address] since your last visit? \" No     3. For patients aged 39-70: Has the patient had a colonoscopy / FIT/ Cologuard? No      If the patient is female:    4. For patients aged 41-77: Has the patient had a mammogram within the past 2 years? No      5. For patients aged 21-65: Has the patient had a pap smear?  No

## 2022-07-27 NOTE — PROGRESS NOTES
Subjective  Chief Complaint   Patient presents with    Follow-up     ADHD     HPI:  Caden Can is a 46 y.o. male. 45 yo male presents for his annual wellness with physical and fasting labs and he has agreed to come in for labs at a later date. His health screenings are as documented in the EMR He is also here for ADHD followup. He denies adverse effects such as chest pain or palpitations or suppression of appetite and sleep. He feels the dosage is adequate for his needs and he is in need of a refill. His pressure is elevated and this has not seemed to be a trend. He has been up since 4:30 or 5am and drove to [facility] for work and then drove back her to get here for his appointment at  and he says that traffic is bad.   He has signed the ADHD paperwork outlining the need to be seen every 4 months and also information on the UDS    Past Medical History:   Diagnosis Date    ADHD (attention deficit hyperactivity disorder)     Fear of public speaking     Generalized anxiety disorder     Performance anxiety      Family History   Problem Relation Age of Onset    Other Father         Multiple Myeloma    Heart Disease Maternal Grandmother     Kidney Disease Maternal Grandmother     Heart Disease Maternal Grandfather     No Known Problems Mother      Social History     Socioeconomic History    Marital status: SINGLE     Spouse name: Not on file    Number of children: Not on file    Years of education: Not on file    Highest education level: Not on file   Occupational History    Not on file   Tobacco Use    Smoking status: Former     Packs/day: 1.00     Years: 20.00     Pack years: 20.00     Types: Cigarettes     Quit date:      Years since quittin.5    Smokeless tobacco: Never   Vaping Use    Vaping Use: Never used   Substance and Sexual Activity    Alcohol use: Yes     Comment: Moderate    Drug use: Never    Sexual activity: Yes   Other Topics Concern    Not on file   Social History Narrative    Not on file     Social Determinants of Health     Financial Resource Strain: Not on file   Food Insecurity: Not on file   Transportation Needs: Not on file   Physical Activity: Not on file   Stress: Not on file   Social Connections: Not on file   Intimate Partner Violence: Not on file   Housing Stability: Not on file     Current Outpatient Medications on File Prior to Visit   Medication Sig Dispense Refill    lisdexamfetamine (Vyvanse) 30 mg capsule Vyvanse 30 mg capsule  TAKE 1 CAPSULE BY MOUTH TWICE A DAY 20 Capsule 0    citalopram (CELEXA) 20 mg tablet Take 1 Tablet by mouth daily. Indications: repeated episodes of anxiety 90 Tablet 2    propranoloL (INDERAL) 40 mg tablet TAKE 1 TABLET BY MOUTH EVERY DAY 30 Tab 0     No current facility-administered medications on file prior to visit. Allergies   Allergen Reactions    Sulfa (Sulfonamide Antibiotics) Nausea Only and Nausea and Vomiting     ROS  ROS per HPI and PMH      Objective  Physical Exam  Vitals and nursing note reviewed. Pulmonary:      Effort: Pulmonary effort is normal.      Breath sounds: Normal breath sounds. Abdominal:      General: Bowel sounds are normal.      Palpations: Abdomen is soft. Neurological:      Mental Status: He is alert and oriented to person, place, and time. Psychiatric:         Mood and Affect: Mood normal.         Behavior: Behavior normal.         Thought Content: Thought content normal.         Judgment: Judgment normal.        Assessment & Plan      ICD-10-CM ICD-9-CM    1. Encounter for wellness examination in adult  Z00.00 V70.0       2. Attention deficit hyperactivity disorder, predominantly inattentive type  F90.0 314.00       3. Screening for deficiency anemia  Z13.0 V78.1       4. Overweight (BMI 25.0-29. 9)  K60.6 033.82 METABOLIC PANEL, COMPREHENSIVE      5. Screening cholesterol level  Z13.220 V77.91 CBC WITH AUTOMATED DIFF      LIPID PANEL      6.  Encounter for hepatitis C screening test for low risk patient  Z11.59 V73.89 HEPATITIS C AB      7. Medication monitoring encounter  Z51.81 V58.83 TOXASSURE SELECT 13 (MW)        Diagnoses and all orders for this visit:    1. Encounter for wellness examination in adult  We are making sure that his health screenings are done in a timely fashion. They are as documented in the EMR    2. Attention deficit hyperactivity disorder, predominantly inattentive type    3. Screening for deficiency anemia  Obtaining updated CBC for trending and will make treatment decisions when I get the results    4. Overweight (BMI 92.9-22.1)  -     METABOLIC PANEL, COMPREHENSIVE  Obtaining updated CMP for trending and will make treatment decisions when I get the results      5. Screening cholesterol level  -     CBC WITH AUTOMATED DIFF  -     LIPID PANEL  Obtaining updated CBC and lipid panel for trending and will make treatment decisions when I get the results      6. Encounter for hepatitis C screening test for low risk patient  -     HEPATITIS C AB  Screening for hep C and will confirm positive with additional lab testing and will also refer to GI for additional evaluation and treatment    7. Medication monitoring encounter  -     Radha Fraser 13 (MW)  UDS to confirm proper consumption of his vyvanse    Follow-up and Dispositions    Return in about 4 months (around 11/27/2022) for ADHD f/u .        Kim Burciaga NP

## 2022-08-01 DIAGNOSIS — F90.0 ATTENTION DEFICIT HYPERACTIVITY DISORDER (ADHD), PREDOMINANTLY INATTENTIVE TYPE: ICD-10-CM

## 2022-08-01 NOTE — TELEPHONE ENCOUNTER
Refilling vyvanse. He was given 20 pills to get him to his appointment.   No inconsistencies noted in

## 2022-08-02 DIAGNOSIS — F90.0 ATTENTION DEFICIT HYPERACTIVITY DISORDER (ADHD), PREDOMINANTLY INATTENTIVE TYPE: ICD-10-CM

## 2022-08-02 LAB — DRUGS UR: NORMAL

## 2022-08-02 NOTE — TELEPHONE ENCOUNTER
Patient says there was an issue with the last refill and they need this to be filled ASAP. They will be out of town after today. Needs to go to [facility] at [address].     PCP: Jose Carlos Mann NP    Last appt: 7/27/2022  Future Appointments   Date Time Provider Carmen Quinn   11/29/2022  3:00 PM Jose Carlos Mann NP [facility]       Requested Prescriptions     Pending Prescriptions Disp Refills    lisdexamfetamine (Vyvanse) 30 mg capsule 60 Capsule 0     Sig: Vyvanse 30 mg capsule  TAKE 1 CAPSULE BY MOUTH TWICE A DAY       Prior labs and Blood pressures:  BP Readings from Last 3 Encounters:   07/27/22 (!) 140/100   01/26/22 132/75   07/23/21 124/80     Lab Results   Component Value Date/Time    Sodium 142 06/13/2009 10:45 PM    Potassium 4.1 06/13/2009 10:45 PM    Chloride 106 06/13/2009 10:45 PM    CO2 28 06/13/2009 10:45 PM    Anion gap 8 06/13/2009 10:45 PM    Glucose 91 06/13/2009 10:45 PM    BUN 9 06/13/2009 10:45 PM    Creatinine 1.0 06/13/2009 10:45 PM    BUN/Creatinine ratio 9 (L) 06/13/2009 10:45 PM    GFR est AA >60 06/13/2009 10:45 PM    GFR est non-AA >60 06/13/2009 10:45 PM    Calcium 9.2 06/13/2009 10:45 PM

## 2022-08-22 NOTE — PROGRESS NOTES
Amphetamine found in UDS which is appropiriate and patient had also THC and a blood alcohol level of .225 both of which are legal substances

## 2022-08-26 PROBLEM — Z00.00 ENCOUNTER FOR WELLNESS EXAMINATION IN ADULT: Status: RESOLVED | Noted: 2022-07-27 | Resolved: 2022-08-26

## 2022-08-31 DIAGNOSIS — F90.0 ATTENTION DEFICIT HYPERACTIVITY DISORDER (ADHD), PREDOMINANTLY INATTENTIVE TYPE: ICD-10-CM

## 2022-09-30 DIAGNOSIS — F90.0 ATTENTION DEFICIT HYPERACTIVITY DISORDER (ADHD), PREDOMINANTLY INATTENTIVE TYPE: ICD-10-CM

## 2022-11-01 DIAGNOSIS — F90.0 ATTENTION DEFICIT HYPERACTIVITY DISORDER (ADHD), PREDOMINANTLY INATTENTIVE TYPE: ICD-10-CM

## 2022-12-02 DIAGNOSIS — F90.0 ATTENTION DEFICIT HYPERACTIVITY DISORDER (ADHD), PREDOMINANTLY INATTENTIVE TYPE: ICD-10-CM

## 2022-12-05 NOTE — TELEPHONE ENCOUNTER
Medication Refused. There is no note as to why it was refused. Pt had last appt with [facility] but [facility] is PCP. Sending to both as an FYI, as it seems both had refilled in past with [facility] taking care of it from last appt.

## 2022-12-06 ENCOUNTER — TELEPHONE (OUTPATIENT)
Dept: FAMILY MEDICINE CLINIC | Age: 42
End: 2022-12-06

## 2022-12-06 NOTE — TELEPHONE ENCOUNTER
Reason for call: Pt and his girlfriend very upset over the phone--he is completely out of his Vyvanse medication. He leaves tomorrow at 6:00am on a trip for 2 days and will not be back until Thursday. He said he requested the Vyvanse 3 times, and was told he needs an appt. The lady offered an appt not until January. He said so were they just going to hold off on filling my meds until January? Apologized to him. Let him know there is an opening this week Friday with [facility]. Let him know I will send a message back and he said, \"Okay but please could someone call me back today? The last 3 times I called I never received a call back this is ridiculous. \" Apologized continuously and said I am sorry you have run out, and said due to the controlled substance policy, we cannot just fill without an appointment he needs to come in every 4 months per PCP. He said he was given no choice but to take a January appt. Apologized and rescheduled for this week Friday. He asked if she can fill the full amount before his appointment as he is out and he will show up in person to his scheduled appt. Is this possible? He would like a call back today. He is very frustrated. Mateo Scales it is $40 to get just a couple pills at the pharmacy to get by and he cannot afford that. He and his girlfriend said this is unprofessional and not a right way to treat patients. Apologized again to them and let them know we do not mistreat our patients or have any ill intent.     Is this a new problem: yes     Date of last appointment:  11/29/2022     Can we respond via Xyo: no    Best call back number: [phone]

## 2022-12-06 NOTE — TELEPHONE ENCOUNTER
Pt advised. He stated that he has been on this medication for 10 years now and makes every effort to come in for his appointments but because he was out of town working he was not able to come in. Advised pt again that he signed the contract stating that he would be seen every 4 months. He stated that he was out of town working and was not able to come in. Could hear female in background stating \"hope they don't want a urine test\". He then said that this was a joke and that we are just not helping. He then said that he was not upset with me but upset with the provider. He stated that she was just a \"clown dog\".

## 2022-12-09 ENCOUNTER — OFFICE VISIT (OUTPATIENT)
Dept: FAMILY MEDICINE CLINIC | Age: 42
End: 2022-12-09
Payer: COMMERCIAL

## 2022-12-09 VITALS
HEART RATE: 82 BPM | WEIGHT: 202.25 LBS | DIASTOLIC BLOOD PRESSURE: 88 MMHG | RESPIRATION RATE: 16 BRPM | TEMPERATURE: 97.8 F | SYSTOLIC BLOOD PRESSURE: 138 MMHG | BODY MASS INDEX: 28.95 KG/M2 | OXYGEN SATURATION: 97 % | HEIGHT: 70 IN

## 2022-12-09 DIAGNOSIS — Z28.20 VACCINE REFUSED BY PATIENT: ICD-10-CM

## 2022-12-09 DIAGNOSIS — F90.0 ATTENTION DEFICIT HYPERACTIVITY DISORDER (ADHD), PREDOMINANTLY INATTENTIVE TYPE: Primary | ICD-10-CM

## 2022-12-09 DIAGNOSIS — F41.1 GENERALIZED ANXIETY DISORDER: ICD-10-CM

## 2022-12-09 RX ORDER — VENLAFAXINE HYDROCHLORIDE 75 MG/1
75 CAPSULE, EXTENDED RELEASE ORAL DAILY
Qty: 90 CAPSULE | Refills: 0 | Status: SHIPPED | OUTPATIENT
Start: 2022-12-09

## 2022-12-09 RX ORDER — PROPRANOLOL HYDROCHLORIDE 40 MG/1
40 TABLET ORAL
COMMUNITY

## 2022-12-09 NOTE — PROGRESS NOTES
Chief Complaint   Patient presents with    Follow-up     ADHD    1. \"Have you been to the ER, urgent care clinic since your last visit? Hospitalized since your last visit? \" No    2. \"Have you seen or consulted any other health care providers outside of the [address] [place] [address] since your last visit? \" No     3. For patients aged 39-70: Has the patient had a colonoscopy / FIT/ Cologuard? No      If the patient is female:    4. For patients aged 41-77: Has the patient had a mammogram within the past 2 years? NA - based on age or sex      11. For patients aged 21-65: Has the patient had a pap smear?  NA - based on age or sex Visit Vitals  /88 (BP 1 Location: Left upper arm, BP Patient Position: Sitting, BP Cuff Size: Adult)   Pulse 82   Temp 97.8 °F (36.6 °C) (Temporal)   Resp 16   Ht 5' 10\" (1.778 m)   Wt 202 lb 4 oz (91.7 kg)   SpO2 97%   BMI 29.02 kg/m²      3 most recent PHQ Screens 12/9/2022   Little interest or pleasure in doing things Not at all   Feeling down, depressed, irritable, or hopeless Not at all   Total Score PHQ 2 0

## 2022-12-09 NOTE — PROGRESS NOTES
Subjective  Chief Complaint   Patient presents with    Follow-up     ADHD     HPI:  Anne Luciano is a 38 y.o. male. Patient presents for management of ADHD and anxiety. Last seen 2022 for wellness. Last seen by me 2021. Anxiety is not well controlled off daily medication. Continues to take Propranolol prn typically for work meetings which works well.      Management of ADHD-  Work Performance: no issues  Organization: good  Appetite: normal, no binge eating, weight stable  Mood: stable, denies irritability and anger  Sleep: good, not tired at work    Past Medical History:   Diagnosis Date    ADHD (attention deficit hyperactivity disorder)     Fear of public speaking     Generalized anxiety disorder     Performance anxiety      Family History   Problem Relation Age of Onset    Other Father         Multiple Myeloma    Heart Disease Maternal Grandmother     Kidney Disease Maternal Grandmother     Heart Disease Maternal Grandfather     No Known Problems Mother      Social History     Socioeconomic History    Marital status: SINGLE     Spouse name: Not on file    Number of children: Not on file    Years of education: Not on file    Highest education level: Not on file   Occupational History    Not on file   Tobacco Use    Smoking status: Former     Packs/day: 1.00     Years: 20.00     Pack years: 20.00     Types: Cigarettes     Quit date:      Years since quittin.9    Smokeless tobacco: Never   Vaping Use    Vaping Use: Never used   Substance and Sexual Activity    Alcohol use: Yes     Comment: Moderate    Drug use: Never    Sexual activity: Yes   Other Topics Concern    Not on file   Social History Narrative    Not on file     Social Determinants of Health     Financial Resource Strain: Not on file   Food Insecurity: Not on file   Transportation Needs: Not on file   Physical Activity: Not on file   Stress: Not on file   Social Connections: Not on file   Intimate Partner Violence: Not on file Housing Stability: Not on file     Current Outpatient Medications on File Prior to Visit   Medication Sig Dispense Refill    propranoloL (INDERAL) 40 mg tablet Take 40 mg by mouth daily as needed. [DISCONTINUED] lisdexamfetamine (Vyvanse) 30 mg capsule Vyvanse 30 mg capsule  TAKE 1 CAPSULE BY MOUTH TWICE A DAY 60 Capsule 0    [DISCONTINUED] citalopram (CELEXA) 20 mg tablet Take 1 Tablet by mouth daily. Indications: repeated episodes of anxiety (Patient not taking: No sig reported) 90 Tablet 2    [DISCONTINUED] propranoloL (INDERAL) 40 mg tablet TAKE 1 TABLET BY MOUTH EVERY DAY (Patient not taking: Reported on 12/9/2022) 30 Tab 0     No current facility-administered medications on file prior to visit. Allergies   Allergen Reactions    Sulfa (Sulfonamide Antibiotics) Nausea Only and Nausea and Vomiting         Objective  Visit Vitals  /88 (BP 1 Location: Left upper arm, BP Patient Position: Sitting, BP Cuff Size: Adult)   Pulse 82   Temp 97.8 °F (36.6 °C) (Temporal)   Resp 16   Ht 5' 10\" (1.778 m)   Wt 202 lb 4 oz (91.7 kg)   SpO2 97%   BMI 29.02 kg/m²       Physical Exam  Vitals and nursing note reviewed. Constitutional:       General: He is not in acute distress. Appearance: Normal appearance. He is overweight. HENT:      Head: Normocephalic. Eyes:      Extraocular Movements: Extraocular movements intact. Cardiovascular:      Rate and Rhythm: Normal rate and regular rhythm. Heart sounds: Normal heart sounds. Pulmonary:      Effort: Pulmonary effort is normal.      Breath sounds: Normal breath sounds. Musculoskeletal:         General: Normal range of motion. Right lower leg: No edema. Left lower leg: No edema. Skin:     General: Skin is warm and dry. Neurological:      Mental Status: He is alert and oriented to person, place, and time.    Psychiatric:         Mood and Affect: Mood normal.         Behavior: Behavior normal.        Assessment & Plan      ICD-10-CM ICD-9-CM    1. Attention deficit hyperactivity disorder (ADHD), predominantly inattentive type  F90.0 314.00 lisdexamfetamine (Vyvanse) 30 mg capsule      2. Generalized anxiety disorder  F41.1 300.02 venlafaxine-SR (EFFEXOR-XR) 75 mg capsule      3. Vaccine refused by patient  Z28.20 V64.09         Diagnoses and all orders for this visit:    1. Attention deficit hyperactivity disorder (ADHD), predominantly inattentive type  ADHD remains well controlled on current medication and dose. PDMP checked, no misuse or abuse noted. Medication refilled as requested. -     lisdexamfetamine (Vyvanse) 30 mg capsule; Vyvanse 30 mg capsule  TAKE 1 CAPSULE BY MOUTH TWICE A DAY    2. Generalized anxiety disorder  Anxiety is not well controlled off daily medication. We discussed side effects can be worse during the first week and then improve (feeling stimulated or sedated, upset stomach, dry mouth, headache, or sexual difficulties). Instructed to take medication daily and not stop abruptly. We discussed it can take 6-8 weeks to reach therapeutic dosing.    -     venlafaxine-SR (EFFEXOR-XR) 75 mg capsule; Take 1 Capsule by mouth daily. 3. Vaccine refused by patient  Declines flu and COVID vaccines. Understands risks. Aspects of this note may have been generated using voice recognition software. Despite editing, there may be some syntax errors. Follow-up and Dispositions    Return in about 6 weeks (around 1/20/2023) for follow up, anxiety, adhd, office visit. I have discussed the diagnosis with the patient and the intended plan as seen in the above orders. The patient has received an after-visit summary and questions were answered concerning future plans. I have discussed medication side effects and warnings with the patient as well.     Willem Gruber NP

## 2022-12-10 LAB
ALBUMIN SERPL-MCNC: 5.3 G/DL (ref 4–5)
ALBUMIN/GLOB SERPL: 2.3 {RATIO} (ref 1.2–2.2)
ALP SERPL-CCNC: 80 IU/L (ref 44–121)
ALT SERPL-CCNC: 87 IU/L (ref 0–44)
AST SERPL-CCNC: 81 IU/L (ref 0–40)
BASOPHILS # BLD AUTO: 0 X10E3/UL (ref 0–0.2)
BASOPHILS NFR BLD AUTO: 1 %
BILIRUB SERPL-MCNC: 0.5 MG/DL (ref 0–1.2)
BUN SERPL-MCNC: 11 MG/DL (ref 6–24)
BUN/CREAT SERPL: 11 (ref 9–20)
CALCIUM SERPL-MCNC: 9.7 MG/DL (ref 8.7–10.2)
CHLORIDE SERPL-SCNC: 97 MMOL/L (ref 96–106)
CHOLEST SERPL-MCNC: 283 MG/DL (ref 100–199)
CO2 SERPL-SCNC: 23 MMOL/L (ref 20–29)
CREAT SERPL-MCNC: 1.02 MG/DL (ref 0.76–1.27)
EGFR: 95 ML/MIN/1.73
EOSINOPHIL # BLD AUTO: 0.1 X10E3/UL (ref 0–0.4)
EOSINOPHIL NFR BLD AUTO: 1 %
ERYTHROCYTE [DISTWIDTH] IN BLOOD BY AUTOMATED COUNT: 13 % (ref 11.6–15.4)
GLOBULIN SER CALC-MCNC: 2.3 G/DL (ref 1.5–4.5)
GLUCOSE SERPL-MCNC: 134 MG/DL (ref 70–99)
HCT VFR BLD AUTO: 49.5 % (ref 37.5–51)
HCV AB S/CO SERPL IA: <0.1 S/CO RATIO (ref 0–0.9)
HDLC SERPL-MCNC: 63 MG/DL
HGB BLD-MCNC: 16.9 G/DL (ref 13–17.7)
IMM GRANULOCYTES # BLD AUTO: 0 X10E3/UL (ref 0–0.1)
IMM GRANULOCYTES NFR BLD AUTO: 0 %
LDLC SERPL CALC-MCNC: 186 MG/DL (ref 0–99)
LYMPHOCYTES # BLD AUTO: 2.5 X10E3/UL (ref 0.7–3.1)
LYMPHOCYTES NFR BLD AUTO: 40 %
MCH RBC QN AUTO: 32.4 PG (ref 26.6–33)
MCHC RBC AUTO-ENTMCNC: 34.1 G/DL (ref 31.5–35.7)
MCV RBC AUTO: 95 FL (ref 79–97)
MONOCYTES # BLD AUTO: 0.5 X10E3/UL (ref 0.1–0.9)
MONOCYTES NFR BLD AUTO: 8 %
NEUTROPHILS # BLD AUTO: 3.1 X10E3/UL (ref 1.4–7)
NEUTROPHILS NFR BLD AUTO: 50 %
PLATELET # BLD AUTO: 271 X10E3/UL (ref 150–450)
POTASSIUM SERPL-SCNC: 4.2 MMOL/L (ref 3.5–5.2)
PROT SERPL-MCNC: 7.6 G/DL (ref 6–8.5)
RBC # BLD AUTO: 5.22 X10E6/UL (ref 4.14–5.8)
SODIUM SERPL-SCNC: 138 MMOL/L (ref 134–144)
TRIGL SERPL-MCNC: 183 MG/DL (ref 0–149)
VLDLC SERPL CALC-MCNC: 34 MG/DL (ref 5–40)
WBC # BLD AUTO: 6.1 X10E3/UL (ref 3.4–10.8)

## 2022-12-22 NOTE — PROGRESS NOTES
2 of your liver labs are slightly elevated. They are not having urgent or emergent level and usually this is indicative of a condition called fatty liver that is caused by lifestyle choices such as dietary and sedentary lifestyles. We will continue to monitor. Your total cholesterol triglycerides and bad cholesterol are also elevated. I would like to try lifestyle changes such as:Decrease your intake of red meat and increase your intake of fatty fish such as salmon, mackerel and sardines. Increase fiber in your diet. Decrease your intake of full fat dairy products such as milk and yogurt and try to incorporate at least 30 minutes of exercise most days of the week and losing weight helps everything. If they continue to increase we may need to talk about a cholesterol medication. Your other labs are basically normal.  Some values may be minimally outside the  \"normal\" range but are not harmful or clinically significant. Please contact the office if you have questions or concerns.   We will recheck all your labs at your next office visit

## 2023-01-05 ENCOUNTER — TELEPHONE (OUTPATIENT)
Dept: FAMILY MEDICINE CLINIC | Age: 43
End: 2023-01-05

## 2023-01-05 DIAGNOSIS — F90.0 ATTENTION DEFICIT HYPERACTIVITY DISORDER (ADHD), PREDOMINANTLY INATTENTIVE TYPE: ICD-10-CM

## 2023-02-03 DIAGNOSIS — F90.0 ATTENTION DEFICIT HYPERACTIVITY DISORDER (ADHD), PREDOMINANTLY INATTENTIVE TYPE: ICD-10-CM

## 2023-02-03 NOTE — TELEPHONE ENCOUNTER
Reason for call: Pt calling--he is requesting a refill on his Vyvanse 30mg.     lisdexamfetamine (Vyvanse) 30 mg capsule  0 ordered  EditCancel Reorder       Summary: Vyvanse 30 mg capsule TAKE 1 CAPSULE BY MOUTH TWICE A DAY, Normal, Disp-60 Capsule, R-0   Start: 1/6/2023  Ord/Sold: 1/5/2023 (O)  Report  Taking:   Long-term:   Pharmacy: [facility]/pharmacy #[id number] Dose History       Patient Sig: Vyvanse 30 mg capsule TAKE 1 CAPSULE BY MOUTH TWICE A DAY       Ordered on: 1/5/2023       Authorized by: Robin Veronica       Dispense: 60 Capsule       Admin Instructions: Vyvanse 30 mg capsule TAKE 1 CAPSULE BY MOUTH TWICE A DAY        [facility]/pharmacy #[id number] [facility], [address] [place] [phone]    Is this a new problem: yes     Date of last appointment:  12/9/2022     Can we respond via Vendor Registry: no    Best call back number: [phone]

## 2023-03-06 DIAGNOSIS — F41.1 GENERALIZED ANXIETY DISORDER: ICD-10-CM

## 2023-03-07 RX ORDER — VENLAFAXINE HYDROCHLORIDE 75 MG/1
CAPSULE, EXTENDED RELEASE ORAL
Qty: 90 CAPSULE | Refills: 0 | Status: SHIPPED | OUTPATIENT
Start: 2023-03-07

## 2023-03-08 DIAGNOSIS — F90.0 ATTENTION DEFICIT HYPERACTIVITY DISORDER (ADHD), PREDOMINANTLY INATTENTIVE TYPE: ICD-10-CM

## 2023-03-08 NOTE — TELEPHONE ENCOUNTER
----- Message from Enedina Jean sent at 3/8/2023 10:20 AM EST -----  Subject: Refill Request    QUESTIONS  Name of Medication? lisdexamfetamine (Vyvanse) 30 mg capsule  Patient-reported dosage and instructions? 30MG   How many days do you have left? 0  Preferred Pharmacy? [facility]/PHARMACY #[id number]  Pharmacy phone number (if available)? [phone]  Additional Information for Provider? Patient advised he requested a refill   and the wrong medication was sent to the pharmacy. Please advise.  ---------------------------------------------------------------------------  --------------  CALL BACK INFO  What is the best way for the office to contact you? OK to leave message on   voicemail  Preferred Call Back Phone Number? [phone]  ---------------------------------------------------------------------------  --------------  SCRIPT ANSWERS  Relationship to Patient?  Self

## 2023-04-04 RX ORDER — PROPRANOLOL HYDROCHLORIDE 40 MG/1
40 TABLET ORAL
Start: 2023-04-04

## 2023-04-04 NOTE — TELEPHONE ENCOUNTER
Patient called in regards to needing a refill on V[facility]se and [facility] [address]    Patient was supposed to come in prior to getting these filled but due to providers schedule patient appointment was moved to 04/10/2023

## 2023-04-06 NOTE — TELEPHONE ENCOUNTER
Patient is calling again about his refills he says he does not want to go the weekend without his meds

## 2023-04-06 NOTE — TELEPHONE ENCOUNTER
I will refill the propranolol but he has an appointment with Karena Garcia on [phone]. He has a habit of no-show.   I will let Karena Garcia refill to make sure he shows upt

## 2023-05-10 DIAGNOSIS — F90.0 ATTENTION DEFICIT HYPERACTIVITY DISORDER, PREDOMINANTLY INATTENTIVE TYPE: Primary | ICD-10-CM

## 2023-05-10 NOTE — TELEPHONE ENCOUNTER
Four month follow up due in the office in August. Appointments are filling up fast. Please have him schedule to prevent refill delays.

## 2023-06-08 ENCOUNTER — TELEPHONE (OUTPATIENT)
Facility: CLINIC | Age: 43
End: 2023-06-08

## 2023-06-08 DIAGNOSIS — F90.0 ATTENTION DEFICIT HYPERACTIVITY DISORDER, PREDOMINANTLY INATTENTIVE TYPE: ICD-10-CM

## 2023-06-08 NOTE — TELEPHONE ENCOUNTER
----- Message from Avtar Bocanegra sent at 6/8/2023  2:05 PM EDT -----  Subject: Refill Request    QUESTIONS  Name of Medication? lisdexamfetamine (VYVANSE) 30 MG capsule  Patient-reported dosage and instructions? twice daily  How many days do you have left? 2  [id number]  Pharmacy phone number (if available)? [phone]  ---------------------------------------------------------------------------  --------------  CALL BACK INFO  What is the best way for the office to contact you? OK to leave message on   voicemail  Preferred Call Back Phone Number? [phone]  ---------------------------------------------------------------------------  --------------  SCRIPT ANSWERS  Relationship to Patient?  Self

## 2023-07-07 DIAGNOSIS — F90.0 ATTENTION DEFICIT HYPERACTIVITY DISORDER, PREDOMINANTLY INATTENTIVE TYPE: ICD-10-CM

## 2023-07-07 NOTE — TELEPHONE ENCOUNTER
Pt called in regards to needing a refill of [facility]mg [facility] [address]  Last visit 04/10/2023  Next visit 08/30/2023

## 2023-08-08 DIAGNOSIS — F90.0 ATTENTION DEFICIT HYPERACTIVITY DISORDER, PREDOMINANTLY INATTENTIVE TYPE: ICD-10-CM

## 2023-08-08 NOTE — TELEPHONE ENCOUNTER
----- Message from Laura Garcia sent at 8/8/2023  3:19 PM EDT -----  Subject: Refill Request    QUESTIONS  Name of Medication? lisdexamfetamine (VYVANSE) 30 MG capsule  Patient-reported dosage and instructions? 2 times daily  How many days do you have left? 4  Preferred Pharmacy? [facility]/PHARMACY #[id number]  Pharmacy phone number (if available)? [phone]  Additional Information for Provider? appt on 8/20  ---------------------------------------------------------------------------  --------------  CALL BACK INFO  What is the best way for the office to contact you? OK to leave message on   voicemail  Preferred Call Back Phone Number? [phone]  ---------------------------------------------------------------------------  --------------  SCRIPT ANSWERS  Relationship to Patient?  Self

## 2023-08-30 ENCOUNTER — OFFICE VISIT (OUTPATIENT)
Facility: CLINIC | Age: 43
End: 2023-08-30
Payer: COMMERCIAL

## 2023-08-30 VITALS
HEART RATE: 84 BPM | SYSTOLIC BLOOD PRESSURE: 120 MMHG | RESPIRATION RATE: 16 BRPM | OXYGEN SATURATION: 96 % | DIASTOLIC BLOOD PRESSURE: 82 MMHG | HEIGHT: 70 IN | TEMPERATURE: 97.8 F | WEIGHT: 205 LBS | BODY MASS INDEX: 29.35 KG/M2

## 2023-08-30 DIAGNOSIS — F41.1 GENERALIZED ANXIETY DISORDER: ICD-10-CM

## 2023-08-30 DIAGNOSIS — F90.0 ATTENTION DEFICIT HYPERACTIVITY DISORDER, PREDOMINANTLY INATTENTIVE TYPE: Primary | ICD-10-CM

## 2023-08-30 PROBLEM — F41.8 PERFORMANCE ANXIETY: Status: RESOLVED | Noted: 2017-11-13 | Resolved: 2023-08-30

## 2023-08-30 PROBLEM — Z00.00 ENCOUNTER FOR WELLNESS EXAMINATION IN ADULT: Status: RESOLVED | Noted: 2022-07-27 | Resolved: 2023-08-30

## 2023-08-30 PROCEDURE — 99214 OFFICE O/P EST MOD 30 MIN: CPT | Performed by: NURSE PRACTITIONER

## 2023-08-30 RX ORDER — CITALOPRAM 20 MG/1
20 TABLET ORAL DAILY
COMMUNITY
End: 2023-08-30 | Stop reason: SINTOL

## 2023-08-30 RX ORDER — DULOXETIN HYDROCHLORIDE 30 MG/1
30 CAPSULE, DELAYED RELEASE ORAL DAILY
Qty: 90 CAPSULE | Refills: 0 | Status: SHIPPED | OUTPATIENT
Start: 2023-08-30

## 2023-08-30 ASSESSMENT — PATIENT HEALTH QUESTIONNAIRE - PHQ9
SUM OF ALL RESPONSES TO PHQ9 QUESTIONS 1 & 2: 0
SUM OF ALL RESPONSES TO PHQ QUESTIONS 1-9: 0
2. FEELING DOWN, DEPRESSED OR HOPELESS: 0
SUM OF ALL RESPONSES TO PHQ QUESTIONS 1-9: 0
SUM OF ALL RESPONSES TO PHQ QUESTIONS 1-9: 0
1. LITTLE INTEREST OR PLEASURE IN DOING THINGS: 0
SUM OF ALL RESPONSES TO PHQ QUESTIONS 1-9: 0

## 2023-08-30 NOTE — PROGRESS NOTES
Subjective  Chief Complaint   Patient presents with    Follow-up     ADHD     HPI:  Stanislaw Gruber is a 38 y.o. male. Patient presents for management of ADHD and anxiety. Citalopram no longer helping anxiety. Higher dose of Citalopram and Venlafaxine caused ED. Has taken Lexapro in the past. Unsure why medication was stopped.      ADHD management-  Current medication and dose: Vyvanse 30mg BID  Last dose taken: this afternoon  Taking daily: yes  Work Performance: no issues  Organization: good  Appetite: normal, no binge eating, weight stable   Mood: stable, denies irritability and anger  Sleep: good, not tired at work   Family: no new stressors    Past Medical History:   Diagnosis Date    ADHD (attention deficit hyperactivity disorder)     Fear of public speaking     Generalized anxiety disorder     Performance anxiety      Family History   Problem Relation Age of Onset    Kidney Disease Maternal Grandmother     Heart Disease Maternal Grandfather     No Known Problems Mother     Heart Disease Maternal Grandmother     Other Father         Multiple Myeloma     Social History     Socioeconomic History    Marital status: Single     Spouse name: Not on file    Number of children: Not on file    Years of education: Not on file    Highest education level: Not on file   Occupational History    Not on file   Tobacco Use    Smoking status: Former     Packs/day: 1.00     Types: Cigarettes     Quit date: 2021     Years since quittin.6    Smokeless tobacco: Never   Substance and Sexual Activity    Alcohol use: Yes    Drug use: Never    Sexual activity: Not on file   Other Topics Concern    Not on file   Social History Narrative    Not on file     Social Determinants of Health     Financial Resource Strain: Not on file   Food Insecurity: Not on file   Transportation Needs: Not on file   Physical Activity: Not on file   Stress: Not on file   Social Connections: Not on file   Intimate Partner Violence: Not on file

## 2023-09-07 DIAGNOSIS — F90.0 ATTENTION DEFICIT HYPERACTIVITY DISORDER, PREDOMINANTLY INATTENTIVE TYPE: ICD-10-CM

## 2023-09-07 NOTE — TELEPHONE ENCOUNTER
Pt needs a refill of vyvanse sent to [facility] off [address]    Pt has appt on 1/5/2024    Questions call pt at [phone]

## 2023-09-09 ENCOUNTER — PATIENT MESSAGE (OUTPATIENT)
Facility: CLINIC | Age: 43
End: 2023-09-09

## 2023-09-09 DIAGNOSIS — F90.0 ATTENTION DEFICIT HYPERACTIVITY DISORDER, PREDOMINANTLY INATTENTIVE TYPE: ICD-10-CM

## 2023-09-11 NOTE — TELEPHONE ENCOUNTER
From: Allie Hamlin  To: Alana Sotelo  Sent: 9/9/2023 9:52 AM EDT  Subject: Switch Pharmacy     Can you please switch the pharmacy for my Vyvanse RX. I called this morning to check the status and they told me it was not in stock and the earliest they would \"possibly\" have it was going to be Wednesday. They said that [facility] in stock and has plenty for my refill.

## 2023-10-04 RX ORDER — PROPRANOLOL HYDROCHLORIDE 40 MG/1
TABLET ORAL
Qty: 90 TABLET | Refills: 1 | Status: SHIPPED | OUTPATIENT
Start: 2023-10-04

## 2023-10-09 DIAGNOSIS — F90.0 ATTENTION DEFICIT HYPERACTIVITY DISORDER, PREDOMINANTLY INATTENTIVE TYPE: ICD-10-CM

## 2023-10-09 NOTE — TELEPHONE ENCOUNTER
----- Message from Tresa Rockwell sent at 10/9/2023 10:51 AM EDT -----  Subject: Refill Request    QUESTIONS  Name of Medication? lisdexamfetamine (VYVANSE) 30 MG capsule  Patient-reported dosage and instructions? 30mg twice a day   How many days do you have left? 2  [id number]  Pharmacy phone number (if available)? [phone]  ---------------------------------------------------------------------------  --------------  CALL BACK INFO  What is the best way for the office to contact you? OK to leave message on   voicemail  Preferred Call Back Phone Number? [phone]  ---------------------------------------------------------------------------  --------------  SCRIPT ANSWERS  Relationship to Patient?  Self

## 2023-10-10 RX ORDER — LISDEXAMFETAMINE DIMESYLATE CAPSULES 30 MG/1
CAPSULE ORAL
Qty: 60 CAPSULE | Refills: 0 | Status: SHIPPED | OUTPATIENT
Start: 2023-10-10 | End: 2023-11-05

## 2023-11-07 DIAGNOSIS — F90.0 ATTENTION DEFICIT HYPERACTIVITY DISORDER, PREDOMINANTLY INATTENTIVE TYPE: ICD-10-CM

## 2023-11-07 RX ORDER — LISDEXAMFETAMINE DIMESYLATE CAPSULES 30 MG/1
CAPSULE ORAL
Qty: 60 CAPSULE | Refills: 0 | Status: SHIPPED | OUTPATIENT
Start: 2023-11-08 | End: 2023-11-09 | Stop reason: SDUPTHER

## 2023-11-07 NOTE — TELEPHONE ENCOUNTER
----- Message from Lilliam Calderón sent at 11/7/2023  8:11 AM EST -----  Subject: Refill Request    QUESTIONS  Name of Medication? lisdexamfetamine (VYVANSE) 30 MG capsule  Patient-reported dosage and instructions? 30 MG / Twice Daily  How many days do you have left? 1  [id number]  Pharmacy phone number (if available)? [phone]  ---------------------------------------------------------------------------  --------------  CALL BACK INFO  What is the best way for the office to contact you? OK to leave message on   voicemail  Preferred Call Back Phone Number? [phone]  ---------------------------------------------------------------------------  --------------  SCRIPT ANSWERS  Relationship to Patient?  Self

## 2023-11-09 DIAGNOSIS — F90.0 ATTENTION DEFICIT HYPERACTIVITY DISORDER, PREDOMINANTLY INATTENTIVE TYPE: ICD-10-CM

## 2023-11-09 RX ORDER — LISDEXAMFETAMINE DIMESYLATE CAPSULES 30 MG/1
CAPSULE ORAL
Qty: 60 CAPSULE | Refills: 0 | Status: SHIPPED | OUTPATIENT
Start: 2023-11-09 | End: 2023-12-04

## 2023-11-09 NOTE — TELEPHONE ENCOUNTER
----- [facility] from [facility] sent at 11/9/2023  2:35 PM EST -----  Subject: Refill Request    QUESTIONS  Name of Medication? lisdexamfetamine (VYVANSE) 30 MG capsule  Patient-reported dosage and instructions? 30  How many days do you have left? 0  [id number]  Pharmacy phone number (if available)? [phone]  Additional Information for Provider? ##URGENT## Pt refill was sent over to   [facility] pharmacy which they no longer have in stock. pt is   requesting that refill be sent to [facility] location instead. ---------------------------------------------------------------------------  --------------  [employer] INFO  What is the best way for the office to contact you? OK to leave message on   voicemail  Preferred Call Back Phone Number? [phone]  ---------------------------------------------------------------------------  --------------  SCRIPT ANSWERS  Relationship to Patient?  Self

## 2023-12-12 ENCOUNTER — OFFICE VISIT (OUTPATIENT)
Facility: CLINIC | Age: 43
End: 2023-12-12
Payer: COMMERCIAL

## 2023-12-12 VITALS
WEIGHT: 203 LBS | DIASTOLIC BLOOD PRESSURE: 72 MMHG | TEMPERATURE: 97.3 F | HEIGHT: 70 IN | OXYGEN SATURATION: 97 % | SYSTOLIC BLOOD PRESSURE: 120 MMHG | HEART RATE: 84 BPM | BODY MASS INDEX: 29.06 KG/M2

## 2023-12-12 DIAGNOSIS — U07.1 COVID-19: ICD-10-CM

## 2023-12-12 DIAGNOSIS — F90.0 ATTENTION DEFICIT HYPERACTIVITY DISORDER, PREDOMINANTLY INATTENTIVE TYPE: ICD-10-CM

## 2023-12-12 DIAGNOSIS — F10.91 ALCOHOL USE DISORDER IN REMISSION: ICD-10-CM

## 2023-12-12 DIAGNOSIS — K76.0 HEPATIC STEATOSIS: ICD-10-CM

## 2023-12-12 DIAGNOSIS — K92.0 HEMATEMESIS, UNSPECIFIED WHETHER NAUSEA PRESENT: ICD-10-CM

## 2023-12-12 DIAGNOSIS — I26.99 PE (PULMONARY THROMBOEMBOLISM) (HCC): Primary | ICD-10-CM

## 2023-12-12 PROCEDURE — 99215 OFFICE O/P EST HI 40 MIN: CPT | Performed by: FAMILY MEDICINE

## 2023-12-12 RX ORDER — OXYCODONE HYDROCHLORIDE AND ACETAMINOPHEN 10; 325 MG/1; MG/1
1 TABLET ORAL EVERY 4 HOURS PRN
COMMUNITY
Start: 2023-12-05

## 2023-12-12 RX ORDER — LISDEXAMFETAMINE DIMESYLATE CAPSULES 30 MG/1
CAPSULE ORAL
Qty: 60 CAPSULE | Refills: 0 | Status: SHIPPED | OUTPATIENT
Start: 2023-12-12 | End: 2024-01-14

## 2023-12-12 NOTE — PROGRESS NOTES
CC: ER follow-up    HPI: Pt is a 41 y.o. male who presents for ER follow-up. Records not available but will request.    On  he went to [facility] ER for hematemesis, was diagnosed with COVID. Hematemesis resolved. Several days later he developed some redness and pain in his arm where he had the IV placed. Then developed SOB. He went back to the  [facility] ER where he had an US done showing superficial thrombophlebitis and CTA showing pulmonary emboli. He was diagnosed with a kidney stone as well. He was started on Eliquis and follow-up with Hematology next month. Since discharge he is taking the Eliquis without problems. He still has some pain in his R arm and in his R lower chest from time to time (at site of PE's). His mother has a h/o Factor V Leiden and has had unprovoked clots. He was told he could not be checked for this in the hospital.    He has not had any further episodes of hematemesis and notes that prior to this he had been drinking pretty heavily for years. He has stopped all alcohol now and is doing well, not having bad cravings. His LFT's were elevated in the hospital and on his labs here last year. He had CT scans done in the hospital noting hepatic steatosis.        Past Medical History:   Diagnosis Date    ADHD (attention deficit hyperactivity disorder)     Fear of public speaking     Generalized anxiety disorder     Performance anxiety        Family History   Problem Relation Age of Onset    Clotting Disorder Mother         Factor 5 leiden mutation (per [facility] record)    Other Father         Multiple Myeloma    Kidney Disease Maternal Grandmother     Heart Disease Maternal Grandmother     Heart Disease Maternal Grandfather        Social History     Tobacco Use    Smoking status: Former     Packs/day: 1     Types: Cigarettes     Quit date: 2021     Years since quittin.9    Smokeless tobacco: Never   Substance Use Topics    Alcohol use: Yes    Drug use: Never       ROS:  Per HPI    PE:  BP

## 2024-01-02 ENCOUNTER — TELEPHONE (OUTPATIENT)
Facility: CLINIC | Age: 44
End: 2024-01-02

## 2024-01-02 DIAGNOSIS — F90.0 ATTENTION DEFICIT HYPERACTIVITY DISORDER, PREDOMINANTLY INATTENTIVE TYPE: ICD-10-CM

## 2024-01-02 DIAGNOSIS — I26.99 PE (PULMONARY THROMBOEMBOLISM) (HCC): Primary | ICD-10-CM

## 2024-01-02 NOTE — TELEPHONE ENCOUNTER
----- Message from Vero Rdz sent at 1/2/2024 11:26 AM EST -----  Subject: Refill Request    QUESTIONS  Name of Medication? lisdexamfetamine (VYVANSE) 30 MG capsule  Patient-reported dosage and instructions? twice a day   How many days do you have left? 0  [id number]  Pharmacy phone number (if available)? [phone]  ---------------------------------------------------------------------------  --------------,  Name of Medication? apixaban (ELIQUIS) 5 MG TABS tablet  Patient-reported dosage and instructions? twice a day   How many days do you have left? 4  Preferred Pharmacy? [facility]/PHARMACY #[id number]  Pharmacy phone number (if available)? [phone]  ---------------------------------------------------------------------------  --------------  CALL BACK INFO  What is the best way for the office to contact you? OK to leave message on   voicemail  Preferred Call Back Phone Number? [phone]  ---------------------------------------------------------------------------  --------------  SCRIPT ANSWERS  Relationship to Patient? Self

## 2024-01-03 RX ORDER — LISDEXAMFETAMINE DIMESYLATE CAPSULES 30 MG/1
CAPSULE ORAL
Qty: 60 CAPSULE | Refills: 0 | Status: SHIPPED | OUTPATIENT
Start: 2024-01-03 | End: 2024-02-05

## 2024-01-04 NOTE — TELEPHONE ENCOUNTER
Thirty day of each sent in to pharmacy. Awaiting Hematology's recommendations this month regarding length of Eliquis course.

## 2024-02-01 ENCOUNTER — TELEPHONE (OUTPATIENT)
Facility: CLINIC | Age: 44
End: 2024-02-01

## 2024-02-01 DIAGNOSIS — F90.0 ATTENTION DEFICIT HYPERACTIVITY DISORDER, PREDOMINANTLY INATTENTIVE TYPE: ICD-10-CM

## 2024-02-01 RX ORDER — LISDEXAMFETAMINE DIMESYLATE CAPSULES 30 MG/1
CAPSULE ORAL
Qty: 60 CAPSULE | Refills: 0 | Status: SHIPPED | OUTPATIENT
Start: 2024-02-01 | End: 2024-03-05

## 2024-02-01 NOTE — TELEPHONE ENCOUNTER
----- Message from Katherin Godinez sent at 2/1/2024  1:22 PM EST -----  Subject: Refill Request    QUESTIONS  Name of Medication? lisdexamfetamine (VYVANSE) 30 MG capsule  Patient-reported dosage and instructions? TAKE 1 CAPSULE BY MOUTH TWICE A   DAY  How many days do you have left? 7  [id number]  Pharmacy phone number (if available)? [phone]  ---------------------------------------------------------------------------  --------------,  Name of Medication? apixaban (ELIQUIS) 5 MG TABS tablet  Patient-reported dosage and instructions? TAKE 1 TABLET BY MOUTH TWICE A   DAY  How many days do you have left? 0  Preferred Pharmacy? [facility]/PHARMACY #[id number]  Pharmacy phone number (if available)? [phone]  ---------------------------------------------------------------------------  --------------  CALL BACK INFO  What is the best way for the office to contact you? OK to leave message on   voicemail  Preferred Call Back Phone Number? [phone]  ---------------------------------------------------------------------------  --------------  SCRIPT ANSWERS  Relationship to Patient? Self